# Patient Record
Sex: MALE | Race: ASIAN | Employment: FULL TIME | URBAN - METROPOLITAN AREA
[De-identification: names, ages, dates, MRNs, and addresses within clinical notes are randomized per-mention and may not be internally consistent; named-entity substitution may affect disease eponyms.]

---

## 2021-08-18 ENCOUNTER — HOSPITAL ENCOUNTER (EMERGENCY)
Facility: CLINIC | Age: 51
Discharge: HOME | End: 2021-08-18
Attending: PHYSICIAN ASSISTANT | Admitting: PHYSICIAN ASSISTANT
Payer: COMMERCIAL

## 2021-08-18 VITALS
RESPIRATION RATE: 20 BRPM | TEMPERATURE: 98.6 F | OXYGEN SATURATION: 99 % | WEIGHT: 115 LBS | SYSTOLIC BLOOD PRESSURE: 158 MMHG | DIASTOLIC BLOOD PRESSURE: 97 MMHG | HEART RATE: 72 BPM

## 2021-08-18 DIAGNOSIS — K64.8 PROLAPSED HEMORRHOIDS: ICD-10-CM

## 2021-08-18 PROCEDURE — 250N000013 HC RX MED GY IP 250 OP 250 PS 637: Performed by: PHYSICIAN ASSISTANT

## 2021-08-18 PROCEDURE — 99283 EMERGENCY DEPT VISIT LOW MDM: CPT

## 2021-08-18 RX ORDER — LIDOCAINE HCL AND HYDROCORTISONE ACETATE 30; 10 MG/G; MG/G
1 CREAM RECTAL 2 TIMES DAILY
Qty: 1 KIT | Refills: 0 | Status: SHIPPED | OUTPATIENT
Start: 2021-08-18 | End: 2021-08-25

## 2021-08-18 RX ORDER — IBUPROFEN 400 MG/1
400 TABLET, FILM COATED ORAL ONCE
Status: COMPLETED | OUTPATIENT
Start: 2021-08-18 | End: 2021-08-18

## 2021-08-18 RX ORDER — IBUPROFEN 400 MG/1
400 TABLET, FILM COATED ORAL ONCE
Status: DISCONTINUED | OUTPATIENT
Start: 2021-08-18 | End: 2021-08-18 | Stop reason: HOSPADM

## 2021-08-18 RX ORDER — LIDOCAINE HYDROCHLORIDE 20 MG/ML
10 JELLY TOPICAL ONCE
Status: DISCONTINUED | OUTPATIENT
Start: 2021-08-18 | End: 2021-08-18 | Stop reason: HOSPADM

## 2021-08-18 RX ADMIN — IBUPROFEN 400 MG: 400 TABLET ORAL at 16:17

## 2021-08-18 ASSESSMENT — ENCOUNTER SYMPTOMS
RECTAL PAIN: 1
CHILLS: 0
ABDOMINAL PAIN: 0
FEVER: 0

## 2021-08-18 NOTE — CONSULTS
[facility]  Colon and Rectal Surgery Consult Note  Name: Juan Beatty    MRN: [id number]  YOB: 1970    Age: 50 year old  Date of admission: 8/18/2021  Primary care provider: [facility]     Requesting Physician:  Carlos Singh PA-C  Reason for consult: Suspected rectal prolapse           History of Present Illness:   Juan Beatty is a 50 year old male, seen at the request of Carlos Singh PA-C, who presents with rectal pain and prolapsing tissue.  4 days ago, he noticed that there was tissue prolapsing from the rectum.  It was painful, so he presented to urgent care the next day.  The provider there diagnosed him with full-thickness rectal prolapse.  No attempt was made to reduce the prolapse, but he was scheduled to see colorectal surgeon Dr. Cazares at the pelvic floor center later this week for further evaluation.  He has had persistent pain since the tissue prolapsed and currently rates it at a 7 out of 10.  He was concerned about the pain so he talked to a relative who is a medical professional, who when told that he had full-thickness rectal prolapse for 4 days, recommended going to the ED. He is having normal bowel movements and is urinating fine.  He denies fevers, chills, and abdominal pain.  He has not had any rectal bleeding.    He has a long history of prolapsing internal hemorrhoids.  He had banding done by Dr. Mora for internal hemorrhoids in 1998 in 1999, as well as a 1 quadrant hemorrhoidectomy for incarcerated internal hemorrhoids and mucosal prolapse.  He has 1 soft bowel movement a day on average.  He does not typically need to strain.  He does not regularly take fiber supplements or stool softeners.  He is not on any blood thinners.      Colonoscopy History: Last done at age 48, had a few polyps removed, was told to repeat in 10 years.    Past abdominal surgery: None.  Has had multiple procedures for hemorrhoids in the past as listed above.             Past Medical History:   No past medical history on file.          Past Surgical History:   No past surgical history on file.            Social History:     Social History     Tobacco Use     Smoking status: Not on file   Substance Use Topics     Alcohol use: Not on file             Family History:   No family history on file.          Allergies:   No Known Allergies          Medications:       ibuprofen  400 mg Oral Once     lidocaine  10 mL Urethral Once             Review of Systems:   A comprehensive greater than 10 system review of systems was carried out.  Pertinent positives and negatives are noted above.  Otherwise negative for contributory info.            Physical Exam:     Blood pressure (!) 158/97, pulse 72, temperature 98.6  F (37  C), temperature source Oral, resp. rate 20, weight 52.2 kg (115 lb), SpO2 99 %.  No intake or output data in the 24 hours ending 08/18/21 1651  Exam:  General - Awake alert and oriented, appears stated age  Pulm - Non-labored breathing with normal respiratory effort  CVS - reg rate and rhythm, no peripheral edema  Rectal exam: Large, swollen, incarcerated grade IV internal hemorrhoids circumferentially.  Some tenderness to palpation. No mucosal or full-thickness rectal prolapse.  Neuro - CN II-XII grossly intact  Musculoskeletal - extremities with no clubbing, cyanosis or edema; able to ambulate  Psych - responsive, alert, cooperative; oriented x3; appropriate mood and affect  External/skin - inspection reveals no rashes, lesions or ulcers, normal coloring             Data Reviewed:   No results found for this or any previous visit (from the past 24 hour(s)).    No results for input(s): WBC, HGB, HCT, MCV, PLT in the last 168 hours.  No results for input(s): NA, POTASSIUM, CHLORIDE, CO2, ANIONGAP, GLC, BUN, CR, GFRESTIMATED, GFRESTBLACK, CHARLIE in the last 168 hours.      Assessment and Plan:   Juan Beatty is a 50 year old male with a history of prolapsing internal  hemorrhoids who presented with 4 days of prolapsing tissue from the rectum and rectal pain.  He was evaluated 3 days ago at urgent care and told he has rectal prolapse, and set up for an appointment with one of our surgeons later this week.  On exam in the ED today, he does not have true rectal prolapse, but rather large grade 4 incarcerated internal hemorrhoids.  He has pain but no necrotic tissue at this time.  Per Dr. Dimas, his only 2 options right now are to either wait it out and it will eventually resolve itself, or undergo a hemorrhoidectomy which would take care of the problem more quickly, but involve significant pain for 2 weeks with at least 8 weeks total of recovery.  He would like to avoid surgery if possible, and elects to try waiting to see if it will resolve on its own.  This is reasonable.    We discussed a plan for pain/symptom control.  He should do sitz baths 3-4 times daily and as needed.  He may continue to use Preparation H ointment but should not use it for more than 2 weeks straight.  Topical lidocaine cream may be helpful as well. He should stick to Tylenol and ibuprofen for oral pain control, and avoid narcotics if possible to prevent constipation.  He should also start taking a daily fiber supplement, and take stool softeners as needed to keep stools soft and prevent straining.  Since he was seen today in the ED and he does not have full-thickness rectal prolapse, he does not need to be seen at the pelvic floor center tomorrow so we will cancel this appointment.  Our office will arrange an outpatient follow-up visit for sometime next week.  I will also call him tomorrow/Friday to see how he is doing.  If his symptoms become worse over the next 24 to 36 hours, he should call the Colon and Rectal Surgery office ([phone]) first thing Friday morning and stay NPO, as we may be able to arrange for hemorrhoidectomy procedure that day.  There is no indication for urgent surgery at this  time and he may discharge to home today.    This plan has been discussed with Dr. Dimas.    Patient specific identified risk factors considered as part of today s evaluation include: Previous history of incarcerated hemorrhoids and previous hemorrhoidectomy     Additional history obtained from ED provider note and previous [facility] records.  Time spent on consultation: 35 minutes, greater than 50% spent on counseling and/or coordination of care.      Quique Multani PA-C  Colorectal Physician Assistant    [facility]  [address] [address]  T: [phone]  F: [phone]

## 2021-08-18 NOTE — ED PROVIDER NOTES
History   Chief Complaint:  Rectal/perineal Pain    HPI   Juan Beatty is a 50 year old male with history of rectal prolapse and hemorrhoids who presents with rectal/perineal pain. He states that his rectum prolapsed 4 days ago, and he has been experiencing pain since then. The patient was seen at  3 days ago, where he states that resolving the prolapse was not attempted. He has an appointment tomorrow afternoon but is concerned about the pain. He denies a fever, chills, or abdominal pain.  He has a previous history of banding of hemorrhoids years ago.  He is still able to have normal bowel movements.      Review of Systems   Constitutional: Negative for chills and fever.   Gastrointestinal: Positive for rectal pain. Negative for abdominal pain.   All other systems reviewed and are negative.      Allergies:  The patient has no known allergies.     Medications:  Lutein-zeaxanthin  Psyllium  Oxycodone    Past Medical History:     Prediabetes  Rectal prolapse   Tuberculosis  Hemorrhoids     Past Surgical History:    Hemorrhoidectomy, internal rubber band ligations    Family History:    Brother: Kidney stones  Father: Alcohol/drug, liver    Social History:  The patient presents alone    Physical Exam     Patient Vitals for the past 24 hrs:   BP Temp Temp src Pulse Resp SpO2 Weight   08/18/21 1348 (!) 158/97 98.6  F (37  C) Oral 72 20 99 % 52.2 kg (115 lb)       Physical Exam  General: Awake, alert, pleasant, non-toxic.  Head:  Scalp is NC/AT  Eyes:  Conjunctiva normal, PERRL  ENT:  The external nose and ears are normal.   Neck:  Normal range of motion without rigidity.  CV:  Regular rate and rhythm    No pathologic murmur, rubs, or gallops.  Resp:  Breath sounds are clear bilaterally.     Non-labored, no retractions or accessory muscle use  Abdomen: Abdomen is soft, no distension, no tenderness, no masses. There is prolapsed tissue from the anal opening as shown below.  This is healthy and pink appearing in  color.  No bleeding.    MS:  No lower extremity edema or asymmetric calf swelling. Normal ROM in all joints without effusions.  Skin:  Warm and dry, No rash or lesions noted. 2+ peripheral pulses in all extremities  Neuro: Alert and oriented x3.  No gross motor deficits.  No facial asymmetry.  Psych: Awake. Alert. Normal affect. Appropriate interactions.            Emergency Department Course     Emergency Department Course:    Reviewed:  I reviewed nursing notes and vitals    Assessments:  1435 I obtained history and examined the patient as noted above.   1640 I rechecked the patient and explained findings.     Consults:   1519 I spoke with Dr. Thakkar who agreed to staff the patient with me  1549 I spoke with andrew Orr, who is going to evaluate the patient in the emergency department  1632 I spoke with andrew Daniels, regarding the patient after he evaluated the patient in the ED.    Interventions:  1617 Ibuprofen 400mg Oral     Disposition:  The patient was discharged to home.     Impression & Plan     Medical Decision Makin yo male presents with concern for prolapsed tissue from the rectal area.  Manual reduction attempted but does not stay reduced.  Colorectal consulted and evaluated the pt in the ED and feels this represents prolapsed hemorrhoids.  No evidence of tissue ischemia on my exam and colorectal agrees.  They discussed operative vs non-operative management with the patient and have decided together on non-operative plan.  Will treat with lidocaine-hydrocortisone cream, stool softeners sitz baths and will see pt for follow-up in clinic.  Return for worsening pain, fever, swelling, not passing stool, etc.    Diagnosis:    ICD-10-CM    1. Prolapsed hemorrhoids  K64.8        Discharge Medications:  Discharge Medication List as of 2021  5:17 PM      START taking these medications    Details   Lidocaine-Hydrocortisone Ace 3-1 % KIT Place 1 Application rectally 2 times daily  for 7 days, Disp-1 kit, R-0, E-Prescribe             Scribe Disclosure:  I, Darius Little, am serving as a scribe at 2:41 PM on 8/18/2021 to document services personally performed by Carlos Singh PA based on my observations and the provider's statements to me.     I, Juan C Schwab, am serving as a scribe () on 8/18/2021 at 5:36 PM to personally document services performed by Carlos Singh PA based on my observations and the provider's statements to me.        Carlos Singh PA-C  08/18/21 1845

## 2021-08-18 NOTE — ED TRIAGE NOTES
"Pt presents with rectal prolapse- pt states symptoms that began on Saturday. Pt seen at  on Sunday and the provider did not try to treat and told patient it was \"impressive.\" Pt has an apt tomorrow with MD but states pain is persisting and he is concerned since symptoms have been going on since Saturday. Denies abd pain, denies fever/chills   "